# Patient Record
Sex: MALE | Race: WHITE | Employment: FULL TIME | URBAN - METROPOLITAN AREA
[De-identification: names, ages, dates, MRNs, and addresses within clinical notes are randomized per-mention and may not be internally consistent; named-entity substitution may affect disease eponyms.]

---

## 2018-01-19 ENCOUNTER — OFFICE VISIT (OUTPATIENT)
Dept: FAMILY MEDICINE | Facility: CLINIC | Age: 58
End: 2018-01-19
Payer: COMMERCIAL

## 2018-01-19 VITALS
BODY MASS INDEX: 21.09 KG/M2 | TEMPERATURE: 98.9 F | WEIGHT: 134.4 LBS | HEART RATE: 103 BPM | HEIGHT: 67 IN | OXYGEN SATURATION: 98 % | SYSTOLIC BLOOD PRESSURE: 110 MMHG | DIASTOLIC BLOOD PRESSURE: 80 MMHG

## 2018-01-19 DIAGNOSIS — R07.0 THROAT PAIN: ICD-10-CM

## 2018-01-19 DIAGNOSIS — R05.9 COUGH: Primary | ICD-10-CM

## 2018-01-19 DIAGNOSIS — J20.9 ACUTE BRONCHITIS, UNSPECIFIED ORGANISM: ICD-10-CM

## 2018-01-19 LAB
DEPRECATED S PYO AG THROAT QL EIA: NORMAL
FLUAV+FLUBV AG SPEC QL: NEGATIVE
FLUAV+FLUBV AG SPEC QL: NEGATIVE
SPECIMEN SOURCE: NORMAL
SPECIMEN SOURCE: NORMAL

## 2018-01-19 PROCEDURE — 99213 OFFICE O/P EST LOW 20 MIN: CPT | Performed by: NURSE PRACTITIONER

## 2018-01-19 PROCEDURE — 87880 STREP A ASSAY W/OPTIC: CPT | Performed by: NURSE PRACTITIONER

## 2018-01-19 PROCEDURE — 87804 INFLUENZA ASSAY W/OPTIC: CPT | Performed by: NURSE PRACTITIONER

## 2018-01-19 PROCEDURE — 87081 CULTURE SCREEN ONLY: CPT | Performed by: NURSE PRACTITIONER

## 2018-01-19 RX ORDER — PREDNISONE 20 MG/1
20 TABLET ORAL DAILY
Qty: 5 TABLET | Refills: 0 | Status: SHIPPED | OUTPATIENT
Start: 2018-01-19 | End: 2019-10-17

## 2018-01-19 RX ORDER — AZITHROMYCIN 250 MG/1
TABLET, FILM COATED ORAL
Qty: 6 TABLET | Refills: 0 | Status: SHIPPED | OUTPATIENT
Start: 2018-01-19 | End: 2019-10-17

## 2018-01-19 ASSESSMENT — ENCOUNTER SYMPTOMS
APPETITE CHANGE: 1
HEADACHES: 0
DIAPHORESIS: 0
FATIGUE: 1
FEVER: 1
EYE ITCHING: 1
SINUS PRESSURE: 1
WHEEZING: 1
NAUSEA: 0
CHILLS: 1
DIARRHEA: 0
CHEST TIGHTNESS: 1
SORE THROAT: 1
VOMITING: 0
COUGH: 1
EYE DISCHARGE: 0
SHORTNESS OF BREATH: 1
MYALGIAS: 1
RHINORRHEA: 0

## 2018-01-19 NOTE — PATIENT INSTRUCTIONS
These are general instructions and may not be specific to you. Please call, email or follow up if you have any questions or concerns.     Bronchitis with Wheezing (Viral or Bacterial: Adult)    Bronchitis is an infection of the air passages. It often occurs during a cold and is usually caused by a virus. Symptoms include cough with mucus (phlegm) and low-grade fever. This illness is contagious during the first few days and is spread through the air by coughing and sneezing, or by direct contact (touching the sick person and then touching your own eyes, nose, or mouth).  If there is a lot of inflammation, air flow is restricted. The air passages may also go into spasm, especially if you have asthma. This causes wheezing and difficulty breathing even in people who do not have asthma.  Bronchitis usually lasts 7 to 14 days. The wheezing should improve with treatment during the first week. An inhaler is often prescribed to relax the air passages and stop wheezing. Antibiotics will be prescribed if your doctor thinks there is also a secondary bacterial infection.  Home care    If symptoms are severe, rest at home for the first 2 to 3 days. When you go back to your usual activities, don't let yourself get too tired.    Do not smoke. Also avoid being exposed to secondhand smoke.    You may use over-the-counter medicine to control fever or pain, unless another medicine was prescribed. Note: If you have chronic liver or kidney disease or have ever had a stomach ulcer or gastrointestinal bleeding, talk with your healthcare provider before using these medicines. Also talk to your provider if you are taking medicine to prevent blood clots.) Aspirin should never be given to anyone younger than 18 years of age who is ill with a viral infection or fever. It may cause severe liver or brain damage.    Your appetite may be poor, so a light diet is fine. Avoid dehydration by drinking 6 to 8 glasses of fluids per day (such as water,  soft drinks, sports drinks, juices, tea, or soup). Extra fluids will help loosen secretions in the nose and lungs.    Over-the-counter cough, cold, and sore-throat medicines will not shorten the length of the illness, but they may be helpful to reduce symptoms. (Note: Do not use decongestants if you have high blood pressure.)    If you were given an inhaler, use it exactly as directed. If you need to use it more often than prescribed, your condition may be worsening. If this happens, contact your healthcare provider.    If prescribed, finish all antibiotic medicine, even if you are feeling better after only a few days.  Follow-up care  Follow up with your healthcare provider, or as advised. If you had an X-ray or ECG (electrocardiogram), a specialist will review it. You will be notified of any new findings that may affect your care.  Note: If you are age 65 or older, or if you have a chronic lung disease or condition that affects your immune system, or you smoke, talk to your healthcare provider about having a pneumococcal vaccinations and a yearly influenza vaccination (flu shot).  When to seek medical advice  Call your healthcare provider right away if any of these occur:    Fever of 100.4 F (38 C) or higher    Coughing up increasing amounts of colored sputum    Weakness, drowsiness, headache, facial pain, ear pain, or a stiff neck  Call 911, or get immediate medical care  Contact emergency services right away if any of these occur.    Coughing up blood    Worsening weakness, drowsiness, headache, or stiff neck    Increased wheezing not helped with medication, shortness of breath, or pain with breathing  Date Last Reviewed: 9/13/2015 2000-2017 [employer]. [address] [place] [address], [place] [address]. All rights reserved. This information is not intended as a substitute for professional medical care. Always follow your healthcare professional's instructions.

## 2018-01-19 NOTE — LETTER
January 22, 2018      Madhav Leal  [address] [place] [id number]                  The results of your recent throat culture were negative. If you have any further questions or concerns please contact the clinic.          Sincerely,        KASSY Pizano CNP

## 2018-01-19 NOTE — NURSING NOTE
"Chief Complaint   Patient presents with     URI       Initial /80 (BP Location: Left arm, Patient Position: Sitting, Cuff Size: Adult Regular)  Pulse 103  Temp 98.9  F (37.2  C) (Tympanic)  Ht 5' 6.75\" (1.695 m)  Wt 134 lb 6.4 oz (61 kg)  SpO2 98%  BMI 21.21 kg/m2 Estimated body mass index is 21.21 kg/(m^2) as calculated from the following:    Height as of this encounter: 5' 6.75\" (1.695 m).    Weight as of this encounter: 134 lb 6.4 oz (61 kg).  Medication Reconciliation: complete     April FARHAD Barker      "

## 2018-01-19 NOTE — MR AVS SNAPSHOT
After Visit Summary   1/19/2018    Madhav eLal    MRN: [id number]           Patient Information     Date Of Birth          1960        Visit Information        Provider Department      1/19/2018 2:00 PM Xiao Houser APRN [facility]        Today's Diagnoses     Cough    -  1    Throat pain        Acute bronchitis, unspecified organism          Care Instructions    These are general instructions and may not be specific to you. Please call, email or follow up if you have any questions or concerns.     Bronchitis with Wheezing (Viral or Bacterial: Adult)    Bronchitis is an infection of the air passages. It often occurs during a cold and is usually caused by a virus. Symptoms include cough with mucus (phlegm) and low-grade fever. This illness is contagious during the first few days and is spread through the air by coughing and sneezing, or by direct contact (touching the sick person and then touching your own eyes, nose, or mouth).  If there is a lot of inflammation, air flow is restricted. The air passages may also go into spasm, especially if you have asthma. This causes wheezing and difficulty breathing even in people who do not have asthma.  Bronchitis usually lasts 7 to 14 days. The wheezing should improve with treatment during the first week. An inhaler is often prescribed to relax the air passages and stop wheezing. Antibiotics will be prescribed if your doctor thinks there is also a secondary bacterial infection.  Home care    If symptoms are severe, rest at home for the first 2 to 3 days. When you go back to your usual activities, don't let yourself get too tired.    Do not smoke. Also avoid being exposed to secondhand smoke.    You may use over-the-counter medicine to control fever or pain, unless another medicine was prescribed. Note: If you have chronic liver or kidney disease or have ever had a stomach ulcer or gastrointestinal bleeding, talk with your  healthcare provider before using these medicines. Also talk to your provider if you are taking medicine to prevent blood clots.) Aspirin should never be given to anyone younger than 18 years of age who is ill with a viral infection or fever. It may cause severe liver or brain damage.    Your appetite may be poor, so a light diet is fine. Avoid dehydration by drinking 6 to 8 glasses of fluids per day (such as water, soft drinks, sports drinks, juices, tea, or soup). Extra fluids will help loosen secretions in the nose and lungs.    Over-the-counter cough, cold, and sore-throat medicines will not shorten the length of the illness, but they may be helpful to reduce symptoms. (Note: Do not use decongestants if you have high blood pressure.)    If you were given an inhaler, use it exactly as directed. If you need to use it more often than prescribed, your condition may be worsening. If this happens, contact your healthcare provider.    If prescribed, finish all antibiotic medicine, even if you are feeling better after only a few days.  Follow-up care  Follow up with your healthcare provider, or as advised. If you had an X-ray or ECG (electrocardiogram), a specialist will review it. You will be notified of any new findings that may affect your care.  Note: If you are age 65 or older, or if you have a chronic lung disease or condition that affects your immune system, or you smoke, talk to your healthcare provider about having a pneumococcal vaccinations and a yearly influenza vaccination (flu shot).  When to seek medical advice  Call your healthcare provider right away if any of these occur:    Fever of 100.4 F (38 C) or higher    Coughing up increasing amounts of colored sputum    Weakness, drowsiness, headache, facial pain, ear pain, or a stiff neck  Call 911, or get immediate medical care  Contact emergency services right away if any of these occur.    Coughing up blood    Worsening weakness, drowsiness, headache, or  "stiff neck    Increased wheezing not helped with medication, shortness of breath, or pain with breathing  Date Last Reviewed: 2015-2017 [employer]. [address] [place] [address], [place] [address]. All rights reserved. This information is not intended as a substitute for professional medical care. Always follow your healthcare professional's instructions.                  Follow-ups after your visit        Who to contact     Normal or non-critical lab and imaging results will be communicated to you by [employer], letter or phone within 4 business days after the clinic has received the results. If you do not hear from us within 7 days, please contact the clinic through [employer] or phone. If you have a critical or abnormal lab result, we will notify you by phone as soon as possible.  Submit refill requests through [employer] or call your pharmacy and they will forward the refill request to us. Please allow 3 business days for your refill to be completed.          If you need to speak with a  for additional information , please call: [phone]           Additional Information About Your Visit        [employer] Information     [employer] lets you send messages to your doctor, view your test results, renew your prescriptions, schedule appointments and more. To sign up, go to [email or web address] . Click on \"Log in\" on the left side of the screen, which will take you to the Welcome page. Then click on \"Sign up Now\" on the right side of the page.     You will be asked to enter the access code listed below, as well as some personal information. Please follow the directions to create your username and password.     Your access code is: [email or web address]  Expires: 2018  2:39 PM     Your access code will  in 90 days. If you need help or a new code, please call your [facility] or [phone].        Care EveryWhere ID     This is your Care EveryWhere ID. This could be used by other " "organizations to access your [facility] medical records  [id number]        Your Vitals Were     Pulse Temperature Height Pulse Oximetry BMI (Body Mass Index)       103 98.9  F (37.2  C) (Tympanic) 5' 6.75\" (1.695 m) 98% 21.21 kg/m2        Blood Pressure from Last 3 Encounters:   01/19/18 110/80   05/22/15 (!) 149/96   12/04/13 131/82    Weight from Last 3 Encounters:   01/19/18 134 lb 6.4 oz (61 kg)   05/22/15 139 lb (63 kg)   12/04/13 146 lb 6.4 oz (66.4 kg)              We Performed the Following     Beta strep group A culture     Influenza A/B antigen     Rapid strep screen          Today's Medication Changes          These changes are accurate as of: 1/19/18  2:39 PM.  If you have any questions, ask your nurse or doctor.               Start taking these medicines.        Dose/Directions    azithromycin 250 MG tablet   Commonly known as:  ZITHROMAX   Used for:  Acute bronchitis, unspecified organism   Started by:  Xiao Houser APRN CNP        Two tablets first day, then one tablet daily for four days.   Quantity:  6 tablet   Refills:  0       predniSONE 20 MG tablet   Commonly known as:  DELTASONE   Used for:  Acute bronchitis, unspecified organism   Started by:  Xiao Houser APRN CNP        Dose:  20 mg   Take 1 tablet (20 mg) by mouth daily   Quantity:  5 tablet   Refills:  0            Where to get your medicines      These medications were sent to [facility] [address] [facility]  [address] [facility] [address]     Phone:  [phone]     azithromycin 250 MG tablet    predniSONE 20 MG tablet                Primary Care Provider Office Phone # Fax #    Britney Elizabeth Anaya [phone] [address] [facility] [address]        Equal Access to Services     ABRIL OROPEZA AH: Haritha fair Soapollo, waaxda luqadaha, qaybta kaalmada adeegyada, ganga winters. So [facility] [phone].    ATENCIÓN: " Si habla andrew, tiene a dobbins disposición servicios gratuitos de asistencia lingüística. Harlan ortiz [phone].    We comply with applicable federal civil rights laws and [place] laws. We do not discriminate on the basis of race, color, national origin, age, disability, sex, sexual orientation, or gender identity.            Thank you!     Thank you for choosing [facility]  for your care. Our goal is always to provide you with excellent care. Hearing back from our patients is one way we can continue to improve our services. Please take a few minutes to complete the written survey that you may receive in the mail after your visit with us. Thank you!             Your Updated Medication List - Protect others around you: Learn how to safely use, store and throw away your medicines at www.disposemymeds.org.          This list is accurate as of: 1/19/18  2:39 PM.  Always use your most recent med list.                   Brand Name Dispense Instructions for use Diagnosis    azithromycin 250 MG tablet    ZITHROMAX    6 tablet    Two tablets first day, then one tablet daily for four days.    Acute bronchitis, unspecified organism       ibuprofen 200 MG tablet    ADVIL/MOTRIN     Take 200 mg by mouth every 4 hours as needed        predniSONE 20 MG tablet    DELTASONE    5 tablet    Take 1 tablet (20 mg) by mouth daily    Acute bronchitis, unspecified organism       TYLENOL 325 MG tablet   Generic drug:  acetaminophen      650 MG = 2 TABLETS BY MOUTH EVERY 4 HOURS AS NEEDED, DO NOT EXCEED 4,000 MG OF ACETAMINOPHEN IN 24 HOURS
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
## 2018-01-20 LAB
BACTERIA SPEC CULT: NORMAL
SPECIMEN SOURCE: NORMAL

## 2018-02-12 ENCOUNTER — TELEPHONE (OUTPATIENT)
Dept: FAMILY MEDICINE | Facility: CLINIC | Age: 58
End: 2018-02-12

## 2018-02-12 NOTE — LETTER
[facility]  [address] [address]  [phone]      February 12, 2018      Madhav Leal  [address] [address]        Dear Madhav,     As part of [facility] commitment to health and wellness, we periodically look at how well we are taking care of you when you're not sick. Along with your annual physical exams in our office, routine cancer screening is an important early detection tool that we can use together to keep you healthy for a long time.    Our most recent records indicate that you are due for one or more of the following:    Physical, fasting labs, tetanus vaccine, flu vaccine, advanced directive planning, depression follow up and colonoscopy.    All of these items can be addressed at a physical.  At your convenience please call us at [phone] to schedule. Make sure to schedule at a time when you can come in fasting.    While we work hard to maintain accurate records on all our patients, it is always possible that this notice does not accurately reflect a surgery you have had in the past to remove your colon, uterus (hysterectomy) or breast tissue (mastectomy). If we have made this error in your case please accept our apologies. To ensure that we do not continue to send you notices please verify at your next visit that we have accurate dates and locations of your surgical history, even if these were done many years ago.     Again, working together for your health is our goal. If you have any questions or concerns regarding this letter, we'd like to hear from you.    Thank you for choosing [facility] for your healthcare needs.    Sincerely,     [facility]

## 2018-02-12 NOTE — TELEPHONE ENCOUNTER
Panel Management Review      Patient has the following on his problem list:     Depression / Dysthymia review    Measure:  Needs PHQ-9 score of 4 or less during index window.  Administer PHQ-9 and if score is 5 or more, send encounter to provider for next steps.    PHQ-9 SCORE 3/4/2008 6/10/2008 10/20/2008   Total Score 7 4 12       If PHQ-9 recheck is 5 or more, route to provider for next steps.    Patient is due for:  PHQ9 and DAP      IVD   ASA: FAILED    Last LDL:    Lab Results   Component Value Date    CHOL 241 01/15/2002     Lab Results   Component Value Date    HDL 65 01/15/2002     No results found for: LDL  No results found for: TRIG   No results found for: CHOLHDLRATIO     Is the patient on a Statin? NO   Is the patient on Aspirin? NO                    Last three blood pressure readings:  BP Readings from Last 3 Encounters:   01/19/18 110/80   05/22/15 (!) 149/96   12/04/13 131/82        Tobacco History:     History   Smoking Status     Current Every Day Smoker     Packs/day: 0.50     Types: Cigarettes   Smokeless Tobacco     Former User         Composite cancer screening  Chart review shows that this patient is due/due soon for the following Colonoscopy  Summary:    Patient is due/failing the following:   Tetanus, flu, advanced directive planning, COLONOSCOPY, DAP, LDL and PHQ9    Action needed:   Patient needs office visit for physical.    Type of outreach:    Sent letter.    Questions for provider review:    None                                                                                                                                    Rosa Maria Barker CMA       Chart routed to none.

## 2018-05-11 ENCOUNTER — TELEPHONE (OUTPATIENT)
Dept: FAMILY MEDICINE | Facility: CLINIC | Age: 58
End: 2018-05-11

## 2018-05-11 DIAGNOSIS — F32.A DEPRESSION: Primary | ICD-10-CM

## 2018-05-11 NOTE — LETTER
May 11, 2018      Madhav Leal  [address] [place] [id number]        Dear Madhav,     As part of [facility]'s commitment to health and wellness, we periodically look at how well we are taking care of you when you're not sick. Along with your annual physical exams in our office, routine cancer screening is an important early detection tool that we can use together to keep you healthy for a long time.    Our most recent records indicate that you are due for one or more of the following:    Physical, fasting labs, tetanus vaccine, colon cancer screening, depression action plan, depression questionnaire and advance directive planning    --Physical. Please call us at [phone] to schedule your next physical. At your physical we can check your fasting labs, discuss advance directive planning and administer your tetanus vaccine.    --Fasting labs. Plan to come fasting 8-10 hours prior to your appointment - nothing to eat or drink EXCEPT water and your medications.    --Depression. Please fill out the enclosed questionnaire. These questions are about your depression and how you have been feeling in the last 2 weeks. The score of this questionnaire helps to determine if your depression is being managed appropriately. Thank you in advance for filling it out. There is a stamped envelope addressed to [facility] for your convenience. Please mail it to back to us as soon as you are able. Also enclosed is a depression action plan for you to keep for your records    -- Colon screen. Colonoscopy or FIT test. One of these tests is recommended at age 50 to screen for colon cancer.  If you have had a colon screen outside of [facility] please call us to let us know so we can update your chart.    Please call one of the following numbers to schedule a colonoscopy:  [facility] [phone]  [facility] [phone]  [facility] [phone]  [facility] [phone] (multiple sites, call for  locations)    A colonoscopy is the gold standard but if you prefer to do a screening that is less invasive and less expensive there is a test for you! It is called the FIT (fecal immunochemical testing) test. It is a screening option that is performed on a yearly basis. This is a test to check for blood in the stool. This test can be done at home and returned to the clinic during office hours. If you are willing to do this test, we can order the kit for you to  at our lab.  Remember, it is always better to do something rather than nothing. Please call us at [phone] if you need an order for a colonoscopy or FIT testing.      While we work hard to maintain accurate records on all our patients, it is always possible that this notice does not accurately reflect a surgery you have had in the past to remove your colon, uterus (hysterectomy) or breast tissue (mastectomy). If we have made this error in your case please accept our apologies. To ensure that we do not continue to send you notices please verify at your next visit that we have accurate dates and locations of your surgical history, even if these were done many years ago.     Again, working together for your health is our goal. If you have any questions or concerns regarding this letter, we'd like to hear from you.    Thank you for choosing [facility] for your healthcare needs.    Sincerely,     [facility]

## 2018-05-11 NOTE — LETTER
My Depression Action Plan  Name: Madhav Leal   Date of Birth 1960  Date: 5/11/2018    My doctor: Britney Anaya   My clinic: [address] [address] [phone]          GREEN    ZONE   Good Control    What it looks like:     Things are going generally well. You have normal up s and down s. You may even feel depressed from time to time, but bad moods usually last less than a day.   What you need to do:  1. Continue to care for yourself (see self care plan)  2. Check your depression survival kit and update it as needed  3. Follow your physician s recommendations including any medication.  4. Do not stop taking medication unless you consult with your physician first.           YELLOW         ZONE Getting Worse    What it looks like:     Depression is starting to interfere with your life.     It may be hard to get out of bed; you may be starting to isolate yourself from others.    Symptoms of depression are starting to last most all day and this has happened for several days.     You may have suicidal thoughts but they are not constant.   What you need to do:     1. Call your care team, your response to treatment will improve if you keep your care team informed of your progress. Yellow periods are signs an adjustment may need to be made.     2. Continue your self-care, even if you have to fake it!    3. Talk to someone in your support network    4. Open up your depression survival kit           RED    ZONE Medical Alert - Get Help    What it looks like:     Depression is seriously interfering with your life.     You may experience these or other symptoms: You can t get out of bed most days, can t work or engage in other necessary activities, you have trouble taking care of basic hygiene, or basic responsibilities, thoughts of suicide or death that will not go away, self-injurious behavior.     What you need to do:  1. Call your care team and  request a same-day appointment. If they are not available (weekends or after hours) call your local crisis line, emergency room or 911.            Depression Self Care Plan / Survival Kit    Self-Care for Depression  Here s the deal. Your body and mind are really not as separate as most people think.  What you do and think affects how you feel and how you feel influences what you do and think. This means if you do things that people who feel good do, it will help you feel better.  Sometimes this is all it takes.  There is also a place for medication and therapy depending on how severe your depression is, so be sure to consult with your medical provider and/ or Behavioral Health Consultant if your symptoms are worsening or not improving.     In order to better manage my stress, I will:    Exercise  Get some form of exercise, every day. This will help reduce pain and release endorphins, the  feel good  chemicals in your brain. This is almost as good as taking antidepressants!  This is not the same as joining a gym and then never going! (they count on that by the way ) It can be as simple as just going for a walk or doing some gardening, anything that will get you moving.      Hygiene   Maintain good hygiene (Get out of bed in the morning, Make your bed, Brush your teeth, Take a shower, and Get dressed like you were going to work, even if you are unemployed).  If your clothes don't fit try to get ones that do.    Diet  I will strive to eat foods that are good for me, drink plenty of water, and avoid excessive sugar, caffeine, alcohol, and other mood-altering substances.  Some foods that are helpful in depression are: complex carbohydrates, B vitamins, flaxseed, fish or fish oil, fresh fruits and vegetables.    Psychotherapy  I agree to participate in Individual Therapy (if recommended).    Medication  If prescribed medications, I agree to take them.  Missing doses can result in serious side effects.  I understand that  drinking alcohol, or other illicit drug use, may cause potential side effects.  I will not stop my medication abruptly without first discussing it with my provider.    Staying Connected With Others  I will stay in touch with my friends, family members, and my primary care provider/team.    Use your imagination  Be creative.  We all have a creative side; it doesn t matter if it s oil painting, sand castles, or mud pies! This will also kick up the endorphins.    Witness Beauty  (AKA stop and smell the roses) Take a look outside, even in mid-winter. Notice colors, textures. Watch the squirrels and birds.     Service to others  Be of service to others.  There is always someone else in need.  By helping others we can  get out of ourselves  and remember the really important things.  This also provides opportunities for practicing all the other parts of the program.    Humor  Laugh and be silly!  Adjust your TV habits for less news and crime-drama and more comedy.    Control your stress  Try breathing deep, massage therapy, biofeedback, and meditation. Find time to relax each day.     My support system    Clinic Contact:  Phone number:    Contact 1:  Phone number:    Contact 2:  Phone number:    M[employer]onism/:  Phone number:    Therapist:  Phone number:    Local crisis center:    Phone number:    Other community support:  Phone number:

## 2018-05-11 NOTE — TELEPHONE ENCOUNTER
Panel Management Review      Patient has the following on his problem list:     Depression / Dysthymia review    Measure:  Needs PHQ-9 score of 4 or less during index window.  Administer PHQ-9 and if score is 5 or more, send encounter to provider for next steps.    5   7 month window range: 11/19/07-1/19/08    PHQ-9 SCORE 3/4/2008 6/10/2008 10/20/2008   Total Score 7 4 12       If PHQ-9 recheck is 5 or more, route to provider for next steps.    Patient is due for:  PHQ9 and DAP      IVD   ASA: FAILED    Last LDL:    Lab Results   Component Value Date    CHOL 241 01/15/2002     Lab Results   Component Value Date    HDL 65 01/15/2002     No results found for: LDL  No results found for: TRIG   No results found for: CHOLHDLRATIO     Is the patient on a Statin? NO   Is the patient on Aspirin? NO                    Last three blood pressure readings:  BP Readings from Last 3 Encounters:   01/19/18 110/80   05/22/15 (!) 149/96   12/04/13 131/82        Tobacco History:     History   Smoking Status     Current Every Day Smoker     Packs/day: 0.50     Types: Cigarettes   Smokeless Tobacco     Former User         Composite cancer screening  Chart review shows that this patient is due/due soon for the following Colonoscopy/Fecal Colorectal (FIT)  Summary:    Patient is due/failing the following:   DAP, HIV screening, Hep C screening, LDL, PHQ9, colon cancer screening, Tdap, Advance Directive Planning    Action needed:   Patient needs office visit for physical and fasting labs. Needs order for colonoscopy/FIT, needs DAP, needs to complete PHQ9    Type of outreach:    Sent letter.    Questions for provider review:    None                                                                                                                                    Rosa Maria Barker CMA       Chart routed to none .

## 2018-08-14 ENCOUNTER — TELEPHONE (OUTPATIENT)
Dept: FAMILY MEDICINE | Facility: CLINIC | Age: 58
End: 2018-08-14

## 2018-08-14 NOTE — LETTER
August 28, 2018      Madhav Leal  [address] [place] [id number]        Dear Madhav,     As part of [facility]'s commitment to health and wellness, we periodically look at how well we are taking care of you when you're not sick. Along with your annual physical exams in our office, routine cancer screening is an important early detection tool that we can use together to keep you healthy for a long time.    Our most recent records indicate that you are due for one or more of the following:    Physical, fasting labs, depression follow up and colon cancer screening.    --Physical. Please call us at [phone] to schedule your next physical with fasting labs.    --Fasting labs. Plan to come fasting 8-10 hours prior to your appointment - nothing to eat or drink EXCEPT water and your medications.    -- Questionnaire for depression. Please fill out and mail back the enclosed questionnaire. These questions are about your depression and how you have been feeling in the last 2 weeks.  Thank you in advance for filling it out. A stamped envelope addressed to [facility] is provided for your convenience.     -- Colon screen. Colonoscopy or FIT test. One of these tests is recommended at age 50 to screen for colon cancer.  If you have had a colon screen outside of [facility] please call us to let us know so we can update your chart.    Please call one of the following numbers to schedule a colonoscopy:  [facility] [phone]  [facility] [phone]  [facility] [phone]  [facility] [phone] (multiple sites, call for locations)    A colonoscopy is the gold standard but if you prefer to do a screening that is less invasive and less expensive there is a test for you! It is called the FIT (fecal immunochemical testing) test. It is a screening option that is performed on a yearly basis. This is a test to check for blood in the stool. This test can be done at home and returned to the clinic  during office hours. If you are willing to do this test, we can order the kit for you to  at our lab.  Remember, it is always better to do something rather than nothing. Please call us at [phone] if you need an order for a colonoscopy or FIT testing.    While we work hard to maintain accurate records on all our patients, it is always possible that this notice does not accurately reflect a surgery you have had in the past to remove your colon, uterus (hysterectomy) or breast tissue (mastectomy). If we have made this error in your case please accept our apologies. To ensure that we do not continue to send you notices please verify at your next visit that we have accurate dates and locations of your surgical history, even if these were done many years ago.     Again, working together for your health is our goal. If you have any questions or concerns regarding this letter, we'd like to hear from you.    Thank you for choosing [facility] for your healthcare needs.    Sincerely,     [facility]

## 2018-08-14 NOTE — TELEPHONE ENCOUNTER
Panel Management Review      Patient has the following on his problem list:     Depression / Dysthymia review    Measure:  Needs PHQ-9 score of 4 or less during index window.  Administer PHQ-9 and if score is 5 or more, send encounter to provider for next steps.    PHQ9 due 12/19/07    PHQ-9 SCORE 3/4/2008 6/10/2008 10/20/2008   Total Score 7 4 12       If PHQ-9 recheck is 5 or more, route to provider for next steps.    Patient is due for:  PHQ9      IVD   ASA: FAILED    Last LDL:    Lab Results   Component Value Date    CHOL 241 01/15/2002     Lab Results   Component Value Date    HDL 65 01/15/2002     No results found for: LDL  No results found for: TRIG   No results found for: CHOLHDLRATIO     Is the patient on a Statin? NO   Is the patient on Aspirin? NO                    Last three blood pressure readings:  BP Readings from Last 3 Encounters:   01/19/18 110/80   05/22/15 (!) 149/96   12/04/13 131/82        Tobacco History:     History   Smoking Status     Current Every Day Smoker     Packs/day: 0.50     Types: Cigarettes   Smokeless Tobacco     Former User         Composite cancer screening  Chart review shows that this patient is due/due soon for the following Colonoscopy/Fecal Colorectal (FIT)  Summary:    Patient is due/failing the following:     Health Maintenance Due   Topic Date Due     HIV SCREEN (SYSTEM ASSIGNED)  12/06/1976     HEPATITIS C SCREENING  12/06/1976     LIPID SCREEN Q5 YR MALE (SYSTEM ASSIGNED)  01/15/2007     PHQ-9 Q6 MONTHS  12/19/2007     COLON CANCER SCREEN (SYSTEM ASSIGNED)  12/06/2008     TETANUS IMMUNIZATION (SYSTEM ASSIGNED)  06/23/2013     ADVANCE DIRECTIVE PLANNING Q5 YRS  12/06/2013         Action needed:   Patient needs office visit for physical and fasting labs., Patient needs to do PHQ9. and Patient needs referral/order: colonoscopy/FIT    Type of outreach:    Phone, left message for patient to call back.  Second message left 8/21/18.    Still unable to reach patient by  phone 8/28/18. Letter sent with PHQ9.       Questions for provider review:    None                                                                                                                                    April FARHAD Barker       Chart routed to none .

## 2019-10-17 ENCOUNTER — OFFICE VISIT (OUTPATIENT)
Dept: FAMILY MEDICINE | Facility: CLINIC | Age: 59
End: 2019-10-17
Payer: COMMERCIAL

## 2019-10-17 VITALS
DIASTOLIC BLOOD PRESSURE: 80 MMHG | SYSTOLIC BLOOD PRESSURE: 124 MMHG | RESPIRATION RATE: 18 BRPM | WEIGHT: 131.6 LBS | BODY MASS INDEX: 20.77 KG/M2 | TEMPERATURE: 98.2 F | HEART RATE: 72 BPM

## 2019-10-17 DIAGNOSIS — M77.9 TENDONITIS: Primary | ICD-10-CM

## 2019-10-17 DIAGNOSIS — L57.0 ACTINIC KERATOSIS: ICD-10-CM

## 2019-10-17 PROCEDURE — 99214 OFFICE O/P EST MOD 30 MIN: CPT | Performed by: NURSE PRACTITIONER

## 2019-10-17 ASSESSMENT — PAIN SCALES - GENERAL: PAINLEVEL: NO PAIN (0)

## 2019-10-17 NOTE — PATIENT INSTRUCTIONS
I did put in the order to Ortho to see hand specialist .   In mean time try using  Blister band -aids of the areas of discomfort to give that are protection   Wear this under the gloves.     For skin see Dermatology for the skin patches. And the  Keratosis
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
## 2019-10-17 NOTE — NURSING NOTE
"Initial /80 (BP Location: Left arm, Patient Position: Chair, Cuff Size: Adult Regular)   Pulse 72   Temp 98.2  F (36.8  C) (Tympanic)   Resp 18   Wt 59.7 kg (131 lb 9.6 oz)   BMI 20.77 kg/m   Estimated body mass index is 20.77 kg/m  as calculated from the following:    Height as of 1/19/18: 1.695 m (5' 6.75\").    Weight as of this encounter: 59.7 kg (131 lb 9.6 oz). .    Sagrario Echavarria CMA ([facility])    "

## 2019-12-13 ENCOUNTER — TELEPHONE (OUTPATIENT)
Dept: FAMILY MEDICINE | Facility: CLINIC | Age: 59
End: 2019-12-13

## 2019-12-13 NOTE — TELEPHONE ENCOUNTER
Panel Management Review      Patient has the following on his problem list: None      Composite cancer screening  Chart review shows that this patient is due/due soon for the following Colonoscopy  Summary:    Patient is due/failing the following:   PE/Labs, PPSV23, Adacel, Shingrix, Colonoscopy    Action needed:   Patient needs office visit for PE with fasting LABS and updated IMMUNIZATIONS and Patient needs referral/order: COLONOSCOPY    Type of outreach:    Sent letter.    Questions for provider review:    None                                                                                                                                    Sagrario Echavarria CMA ([facility])       Chart routed to None .

## 2019-12-13 NOTE — LETTER
December 13, 2019      Madhav Leal  [address] [place] [id number]      Dear Madhav Leal    We care about your health and have reviewed your health plan including your medical conditions, medication list, and lab results.  Based on this review, it is recommended that you follow up regarding the following health topic(s):     -Colon Cancer Screening  -Wellness (Physical) Visit   -Immunizations    We recommend you take the following action(s):    -- Pneumonia and shingles vaccines. These are recommended for your age group. To receive these vaccines please call to schedule a nurse appointment or, if it is more convenient for you, our pharmacy takes walk ins for these also.     -- Tetanus vaccine. Please call to schedule a nurse appointment to update this vaccine or, if it is more convenient for you, our pharmacy takes walk ins for this also.    -- Physical. Please call our clinic to schedule your physical appointment. Please plan to be fasting for 8 to 10 hours prior to this appointment (nothing to eat or drink except water and medications).     -- Colon screen. Colonoscopy or FIT test (take home test). One of these tests is recommended at age 50 to screen for colon cancer.   Please call one of the following numbers to schedule a colonoscopy:  [facility] [phone]  [facility] [phone]  [facility] [phone]  [facility] [phone] (multiple sites, call for locations)  OR....  If you prefer to do a screening that is LESS INVASIVE AND LESS EXPENSIVE there is an test for you! It is called the FIT test. It is a screening test that is done yearly and can be DONE AT HOME! Do the test at home and mail it in (you don't even have to pay for postage). If you are willing to do this test, we can order the kit for you to  at our clinic. Please call us at [phone] if you need an order for a colonoscopy or FIT testing.    Please call us at [phone] (or use [employer])  to address the above recommendations.     Thank you for trusting [facility] and we appreciate the opportunity to serve you.  We look forward to supporting your healthcare needs in the future.    Healthy Regards,      Your Health Care Team  [facility]